# Patient Record
Sex: MALE | Race: WHITE | URBAN - METROPOLITAN AREA
[De-identification: names, ages, dates, MRNs, and addresses within clinical notes are randomized per-mention and may not be internally consistent; named-entity substitution may affect disease eponyms.]

---

## 2020-10-05 ENCOUNTER — EMERGENCY (EMERGENCY)
Facility: HOSPITAL | Age: 30
LOS: 0 days | Discharge: HOME | End: 2020-10-05
Attending: EMERGENCY MEDICINE
Payer: MEDICAID

## 2020-10-05 VITALS — WEIGHT: 179.9 LBS

## 2020-10-05 VITALS
TEMPERATURE: 99 F | RESPIRATION RATE: 18 BRPM | OXYGEN SATURATION: 96 % | HEART RATE: 83 BPM | DIASTOLIC BLOOD PRESSURE: 67 MMHG | SYSTOLIC BLOOD PRESSURE: 157 MMHG

## 2020-10-05 DIAGNOSIS — R05 COUGH: ICD-10-CM

## 2020-10-05 DIAGNOSIS — R04.2 HEMOPTYSIS: ICD-10-CM

## 2020-10-05 DIAGNOSIS — J40 BRONCHITIS, NOT SPECIFIED AS ACUTE OR CHRONIC: ICD-10-CM

## 2020-10-05 LAB
ALBUMIN SERPL ELPH-MCNC: 3.9 G/DL — SIGNIFICANT CHANGE UP (ref 3.3–5)
ALP SERPL-CCNC: 55 U/L — SIGNIFICANT CHANGE UP (ref 40–120)
ALT FLD-CCNC: 122 U/L — HIGH (ref 12–78)
ANION GAP SERPL CALC-SCNC: 3 MMOL/L — LOW (ref 5–17)
AST SERPL-CCNC: 72 U/L — HIGH (ref 15–37)
BASOPHILS # BLD AUTO: 0.05 K/UL — SIGNIFICANT CHANGE UP (ref 0–0.2)
BASOPHILS NFR BLD AUTO: 0.9 % — SIGNIFICANT CHANGE UP (ref 0–2)
BILIRUB SERPL-MCNC: 0.8 MG/DL — SIGNIFICANT CHANGE UP (ref 0.2–1.2)
BUN SERPL-MCNC: 15 MG/DL — SIGNIFICANT CHANGE UP (ref 7–23)
CALCIUM SERPL-MCNC: 8.7 MG/DL — SIGNIFICANT CHANGE UP (ref 8.5–10.1)
CHLORIDE SERPL-SCNC: 102 MMOL/L — SIGNIFICANT CHANGE UP (ref 96–108)
CO2 SERPL-SCNC: 31 MMOL/L — SIGNIFICANT CHANGE UP (ref 22–31)
CREAT SERPL-MCNC: 1.11 MG/DL — SIGNIFICANT CHANGE UP (ref 0.5–1.3)
D DIMER BLD IA.RAPID-MCNC: 223 NG/ML DDU — SIGNIFICANT CHANGE UP
EOSINOPHIL # BLD AUTO: 0.28 K/UL — SIGNIFICANT CHANGE UP (ref 0–0.5)
EOSINOPHIL NFR BLD AUTO: 4.9 % — SIGNIFICANT CHANGE UP (ref 0–6)
GLUCOSE SERPL-MCNC: 96 MG/DL — SIGNIFICANT CHANGE UP (ref 70–99)
HCT VFR BLD CALC: 39.9 % — SIGNIFICANT CHANGE UP (ref 39–50)
HGB BLD-MCNC: 12.9 G/DL — LOW (ref 13–17)
IMM GRANULOCYTES NFR BLD AUTO: 0.2 % — SIGNIFICANT CHANGE UP (ref 0–1.5)
LYMPHOCYTES # BLD AUTO: 1.35 K/UL — SIGNIFICANT CHANGE UP (ref 1–3.3)
LYMPHOCYTES # BLD AUTO: 23.9 % — SIGNIFICANT CHANGE UP (ref 13–44)
MCHC RBC-ENTMCNC: 29.9 PG — SIGNIFICANT CHANGE UP (ref 27–34)
MCHC RBC-ENTMCNC: 32.3 GM/DL — SIGNIFICANT CHANGE UP (ref 32–36)
MCV RBC AUTO: 92.4 FL — SIGNIFICANT CHANGE UP (ref 80–100)
MONOCYTES # BLD AUTO: 0.92 K/UL — HIGH (ref 0–0.9)
MONOCYTES NFR BLD AUTO: 16.3 % — HIGH (ref 2–14)
NEUTROPHILS # BLD AUTO: 3.05 K/UL — SIGNIFICANT CHANGE UP (ref 1.8–7.4)
NEUTROPHILS NFR BLD AUTO: 53.8 % — SIGNIFICANT CHANGE UP (ref 43–77)
PLATELET # BLD AUTO: 236 K/UL — SIGNIFICANT CHANGE UP (ref 150–400)
POTASSIUM SERPL-MCNC: 4.4 MMOL/L — SIGNIFICANT CHANGE UP (ref 3.5–5.3)
POTASSIUM SERPL-SCNC: 4.4 MMOL/L — SIGNIFICANT CHANGE UP (ref 3.5–5.3)
PROT SERPL-MCNC: 7.7 GM/DL — SIGNIFICANT CHANGE UP (ref 6–8.3)
RBC # BLD: 4.32 M/UL — SIGNIFICANT CHANGE UP (ref 4.2–5.8)
RBC # FLD: 14 % — SIGNIFICANT CHANGE UP (ref 10.3–14.5)
SODIUM SERPL-SCNC: 136 MMOL/L — SIGNIFICANT CHANGE UP (ref 135–145)
WBC # BLD: 5.66 K/UL — SIGNIFICANT CHANGE UP (ref 3.8–10.5)
WBC # FLD AUTO: 5.66 K/UL — SIGNIFICANT CHANGE UP (ref 3.8–10.5)

## 2020-10-05 PROCEDURE — 71046 X-RAY EXAM CHEST 2 VIEWS: CPT | Mod: XU

## 2020-10-05 PROCEDURE — U0003: CPT

## 2020-10-05 PROCEDURE — 71046 X-RAY EXAM CHEST 2 VIEWS: CPT | Mod: 26

## 2020-10-05 PROCEDURE — 85025 COMPLETE CBC W/AUTO DIFF WBC: CPT

## 2020-10-05 PROCEDURE — 99283 EMERGENCY DEPT VISIT LOW MDM: CPT

## 2020-10-05 PROCEDURE — 85379 FIBRIN DEGRADATION QUANT: CPT

## 2020-10-05 PROCEDURE — 80053 COMPREHEN METABOLIC PANEL: CPT

## 2020-10-05 PROCEDURE — 99283 EMERGENCY DEPT VISIT LOW MDM: CPT | Mod: 25

## 2020-10-05 PROCEDURE — 87040 BLOOD CULTURE FOR BACTERIA: CPT

## 2020-10-05 PROCEDURE — 36415 COLL VENOUS BLD VENIPUNCTURE: CPT

## 2020-10-05 RX ORDER — AZITHROMYCIN 500 MG/1
1 TABLET, FILM COATED ORAL
Qty: 6 | Refills: 0
Start: 2020-10-05 | End: 2020-10-09

## 2020-10-05 NOTE — ED STATDOCS - PROGRESS NOTE DETAILS
30 y/o M with no PMHx presents to the ED c/o +hemoptysis. Reports 1 week ago he was hospitalized for 2 days at hospital in [place] 2/2 PNA and WOODROW. States he was on IV Zithromax while hospitalized, left AMA, and was given course of cefuroxime to take home which he has since finished 5 days ago.   PT. smoker. Labs unremarkable.  CXR Negative for acute findings.  Will DC with pulmonology follow up.  Zithromax Rx.  Avis Kenny PA-C

## 2020-10-05 NOTE — ED STATDOCS - OBJECTIVE STATEMENT
28 y/o M with no PMHx presents to the ED c/o +hemoptysis. Reports 1 week ago he was hospitalized for 2 days at hospital in [place] 2/2 PNA and [facility]ODROW. States he was on IV Zithromax while hospitalized, left AMA, and was given course of cefuroxime to take home which he has since finished 5 days ago. No fever. NKDA. Will send pt to main ED for further evaluation. 30 y/o M with no PMHx presents to the ED c/o +hemoptysis. Reports 1 week ago he was hospitalized for 2 days at hospital in [place] 2/2 PNA and WOODROW. States he was on IV Zithromax while hospitalized, left AMA, and was given course of cefuroxime to take home which he has since finished 5 days ago. No fever. NKDA.

## 2020-10-05 NOTE — ED ADULT NURSE NOTE - OBJECTIVE STATEMENT
Pt states he feels sob for past week and was diagnosed with PNA recently, is back now after being discharged, pt states worsening chest pain and wants further testing, states worsening chest pain with deep inspiration.

## 2020-10-05 NOTE — ED STATDOCS - PATIENT PORTAL LINK FT
You can access the FollowMyHealth Patient Portal offered by [facility] by registering at the following website: [email or web address]. By joining [employer]’s FollowMyHealth portal, you will also be able to view your health information using other applications (apps) compatible with our system.

## 2020-10-05 NOTE — ED STATDOCS - CLINICAL SUMMARY MEDICAL DECISION MAKING FREE TEXT BOX
Labs unremarkable.  CXR Negative for acute findings.  Will DC with pulmonology follow up.  Zithromax Rx.  Avis Kenny PA-C

## 2020-10-05 NOTE — ED STATDOCS - CARE PROVIDER_API CALL
Aravind Gray  INTERNAL MEDICINE  [address] [place] [address]  Phone: [phone]  Fax: [phone]  Follow Up Time:

## 2020-10-05 NOTE — ED ADULT TRIAGE NOTE - CHIEF COMPLAINT QUOTE
Pt complains of worsening cough with some blood-tinged mucus, reports he was hospitalized one week ago for pneumonia and renal failure.  No respiratory distress on arrival.

## 2020-10-06 LAB — SARS-COV-2 RNA SPEC QL NAA+PROBE: SIGNIFICANT CHANGE UP

## 2020-10-11 LAB
CULTURE RESULTS: SIGNIFICANT CHANGE UP
CULTURE RESULTS: SIGNIFICANT CHANGE UP
SPECIMEN SOURCE: SIGNIFICANT CHANGE UP
SPECIMEN SOURCE: SIGNIFICANT CHANGE UP

## 2021-12-05 NOTE — ED ADULT NURSE NOTE - NSFALLRSKASSESASSIST_ED_ALL_ED
Care plan note:      Recent Vitals:  Temp: 98.3  F (36.8  C) Temp src: Axillary BP: 115/63 Pulse: 92   Resp: 20 SpO2: 97 % O2 Device: High Flow Nasal Cannula (HFNC)      Orientation/Neuro: Confused, Disoriented to, Person, Place , Time, Situation, Impulsive  Pain: The patient is not having any pain.   Tele: BBB and Atrial fibrillation - controlled   IV medications: Central Line   Mobility: Assist of 2, Total w/ lift and Bedrest   Skin: Rashes: significnat groin perneal rash- miconazole pwder applied.    GI: last BM incontinent  and 12/5  : Zuniga Catheter     Diet: Tolerating diet:  NPO - using yankers at bedside r/t not tolerating secreations  Orders Placed This Encounter      NPO per Anesthesia Guidelines for Procedure/Surgery Except for: Meds, Other; Specify: Stop tube feeds at midnight      Safety/Concerns:  Fall Risk, Sudarshan Risk, Aspiration precautions and Isolation precautions  Aggression Color: Green    Plan: Received Pt from ICU, Lethargic. noted red  nares left, repositioned High flow. mepilex to occital, coccyx, fore arm and cheeks.  Pt nonverbal.  Restless with rt hand, in moises to prevent pulling off o2.  Pt has no noted AROM in LE's, will wiggle fingers on command and will move arms weakly. TF running, patent,  Blood sugar elevated, receiving coverage.    Continue to monitor.       Ellie Phoenix RN      no